# Patient Record
Sex: FEMALE | Race: BLACK OR AFRICAN AMERICAN | NOT HISPANIC OR LATINO | URBAN - METROPOLITAN AREA
[De-identification: names, ages, dates, MRNs, and addresses within clinical notes are randomized per-mention and may not be internally consistent; named-entity substitution may affect disease eponyms.]

---

## 2018-04-24 ENCOUNTER — APPOINTMENT (RX ONLY)
Dept: URBAN - METROPOLITAN AREA CLINIC 12 | Facility: CLINIC | Age: 53
Setting detail: DERMATOLOGY
End: 2018-04-24

## 2018-04-24 DIAGNOSIS — Z41.1 ENCOUNTER FOR COSMETIC SURGERY: ICD-10-CM

## 2018-04-24 PROCEDURE — ? PATIENT SPECIFIC COUNSELING

## 2018-04-24 PROCEDURE — ? CONSULTATION - RHINOPLASTY

## 2018-04-24 PROCEDURE — ? CONSULTATION - BREAST (COSMETIC)

## 2018-04-24 NOTE — PROCEDURE: PATIENT SPECIFIC COUNSELING
Detail Level: Zone
Other (Free Text): Patient had a Rhinoplasty 30 years ago. Pt presents today with concern about the crooked appearance of her nose. She states her nose was crooked before her prior procedure. A cartilage graft was placed in her nose during her prior procedure, however she does not remember where the cartilage was grafted from. Dr. Swan discussed with patient that he can feel that there is a cartilage graft out of place in the bridge of her nose.\\n Dr. Swan informed pt that she is a great candidate for a rhinoplasty. Explained to pt that depending on the graft that was placed there, it may need to be replaced with another graft. Informed pt that the cartilage could be grafted from her rib or an artificial graft could be used from pig cartilage, which would be an easier recovery.
Other (Free Text): Patient had silicone implants put in place 30 years ago. She states she does not know the implant size, but that she would like to increase in size. Patient currently wears a 36 C.\\nBreast Exam: \\nPatient has grade 3 capsules. \\nAreolar diameter: (L) 4 cm (R) 3 cm\\n\\Thien Swan informed pt that she is a great candidate for a breast implant exchange. Dr. Swan explained to pt that in order to get the capsule and scar tissue out, an incision would need to be made in the crease of her breast as opposed to her previous incisions in periarealor. Dr. Swan discussed with pt that it would not be an issue to go larger. \\n\\Thien Swan informed pt that both procedures can be done in the OR together. \\nDana quoted pt for rhinoplasty, with and without cartilage implant, and a breast implant exchange.

## 2018-04-24 NOTE — PROCEDURE: CONSULTATION - BREAST (COSMETIC)
Detail Level: Simple
Consultation Charge $ (Use Numbers Only, No Text Please.): 100
Send Procedure Quote As Charge: No

## 2018-04-24 NOTE — HPI: BREAST (AESTHETIC DEFORMITY, BREAST)
Which Breast(S) Are You Concerned About?: bilateral breasts
Which Side(S)?: both breasts (with identical techniques and implants)
Is This A New Presentation, Or A Follow-Up?: Breast (Aesthetic Breast Deformity)
Referral Source: Dr. Pepe
Additional History: Patient is here for a consultation regarding her breast implants. She has had them in for 30 years.

## 2021-03-05 ENCOUNTER — APPOINTMENT (RX ONLY)
Dept: URBAN - METROPOLITAN AREA OTHER 5 | Facility: OTHER | Age: 56
Setting detail: DERMATOLOGY
End: 2021-03-05

## 2021-03-05 DIAGNOSIS — Z41.1 ENCOUNTER FOR COSMETIC SURGERY: ICD-10-CM

## 2021-03-05 PROCEDURE — ? PATIENT SPECIFIC COUNSELING

## 2021-03-05 PROCEDURE — ? CONSULTATION - LIPOSUCTION

## 2021-03-05 PROCEDURE — ? CONSULTATION - BREAST (COSMETIC)

## 2021-03-05 PROCEDURE — ? OTHER

## 2021-03-05 ASSESSMENT — LOCATION DETAILED DESCRIPTION DERM
LOCATION DETAILED: RIGHT SUPERIOR LATERAL LOWER BACK
LOCATION DETAILED: PERIUMBILICAL SKIN
LOCATION DETAILED: LEFT MEDIAL BREAST 6-7:00 REGION
LOCATION DETAILED: RIGHT MEDIAL BREAST 5-6:00 REGION
LOCATION DETAILED: LEFT LATERAL ABDOMEN
LOCATION DETAILED: LEFT MEDIAL BREAST 7-8:00 REGION
LOCATION DETAILED: LEFT SUPERIOR LATERAL LOWER BACK
LOCATION DETAILED: INFERIOR THORACIC SPINE
LOCATION DETAILED: RIGHT LATERAL ABDOMEN
LOCATION DETAILED: LEFT SUPRAPUBIC SKIN

## 2021-03-05 ASSESSMENT — LOCATION SIMPLE DESCRIPTION DERM
LOCATION SIMPLE: LEFT BREAST
LOCATION SIMPLE: RIGHT LOWER BACK
LOCATION SIMPLE: GROIN
LOCATION SIMPLE: UPPER BACK
LOCATION SIMPLE: RIGHT BREAST
LOCATION SIMPLE: ABDOMEN
LOCATION SIMPLE: LEFT LOWER BACK

## 2021-03-05 ASSESSMENT — LOCATION ZONE DERM: LOCATION ZONE: TRUNK

## 2021-03-05 NOTE — HPI: ABDOMEN (AESTHETIC DEFORMITY, ABDOMEN)
Is This A New Presentation, Or A Follow-Up?: Abdominal Aesthetic Deformity
Additional History: Liposuction abdomen and flanks.

## 2021-03-05 NOTE — PROCEDURE: CONSULTATION - BREAST (COSMETIC)
Consultation Charge $ (Use Numbers Only, No Text Please.): 0.00
Send Procedure Quote As Charge: No
Detail Level: Detailed

## 2021-03-05 NOTE — PROCEDURE: CONSULTATION - LIPOSUCTION
Detail Level: Detailed
Send Procedure Quote As Charge: No
Consultation Charge $ (Use Numbers Only, No Text Please.): 0.00
done

## 2021-03-05 NOTE — PROCEDURE: OTHER
Other (Free Text): Patient has significant scoliosis. Patient is aware.
Detail Level: Zone
Render Risk Assessment In Note?: no

## 2021-07-21 ENCOUNTER — APPOINTMENT (RX ONLY)
Dept: URBAN - METROPOLITAN AREA OTHER 5 | Facility: OTHER | Age: 56
Setting detail: DERMATOLOGY
End: 2021-07-21

## 2021-07-21 DIAGNOSIS — Z41.1 ENCOUNTER FOR COSMETIC SURGERY: ICD-10-CM

## 2021-07-21 DIAGNOSIS — E88.1 LIPODYSTROPHY, NOT ELSEWHERE CLASSIFIED: ICD-10-CM

## 2021-07-21 PROCEDURE — ? PRE-OP WORKLIST

## 2021-07-21 PROCEDURE — ? PATIENT SPECIFIC COUNSELING

## 2021-07-21 PROCEDURE — ? PRESCRIPTION

## 2021-07-21 RX ORDER — GABAPENTIN 300 MG/1
CAPSULE ORAL
Qty: 12 | Refills: 0 | Status: ERX | COMMUNITY
Start: 2021-07-21

## 2021-07-21 RX ORDER — CELECOXIB 200 MG/1
CAPSULE ORAL
Qty: 12 | Refills: 0 | Status: ERX | COMMUNITY
Start: 2021-07-21

## 2021-07-21 RX ORDER — ONDANSETRON 8 MG/1
TABLET, ORALLY DISINTEGRATING ORAL
Qty: 10 | Refills: 0 | Status: ERX | COMMUNITY
Start: 2021-07-21

## 2021-07-21 RX ADMIN — GABAPENTIN: 300 CAPSULE ORAL at 00:00

## 2021-07-21 RX ADMIN — ONDANSETRON: 8 TABLET, ORALLY DISINTEGRATING ORAL at 00:00

## 2021-07-21 RX ADMIN — CELECOXIB: 200 CAPSULE ORAL at 00:00

## 2021-07-21 ASSESSMENT — LOCATION DETAILED DESCRIPTION DERM
LOCATION DETAILED: LEFT INFERIOR CENTRAL MALAR CHEEK
LOCATION DETAILED: LEFT SUPERIOR LATERAL LOWER BACK
LOCATION DETAILED: LEFT MEDIAL BREAST 7-8:00 REGION
LOCATION DETAILED: RIGHT SUPERIOR LATERAL LOWER BACK
LOCATION DETAILED: RIGHT LATERAL BREAST 6-7:00 REGION

## 2021-07-21 ASSESSMENT — LOCATION SIMPLE DESCRIPTION DERM
LOCATION SIMPLE: RIGHT BREAST
LOCATION SIMPLE: LEFT LOWER BACK
LOCATION SIMPLE: RIGHT LOWER BACK
LOCATION SIMPLE: LEFT CHEEK
LOCATION SIMPLE: LEFT BREAST

## 2021-07-21 ASSESSMENT — LOCATION ZONE DERM
LOCATION ZONE: TRUNK
LOCATION ZONE: FACE

## 2021-07-21 NOTE — HPI: PREOPERATIVE APPOINTMENT
Has The Patient Completed Informed Consent?: is scheduled to complete informed consent documentation during this visit
Has The Patient Fulfilled Financial Responsibilities For Surgical Scheduling?: has fulfilled financial responsibilities
Have Arrangements And Instructions Been Made For Patient Transportation?: transportation arrangements will be made
Date Of Procedure?: 08/05/2021
Facility: [facility]
Surgeon: Dr. Margarita Swan
Describe The Procedure In Detail: Capsulectomy with implant exchange bilateral, fat grafting to breast and face, liposuction to flanks
Additional History: Patient presents today for preop\\nWeight: 150\\nHeight: 5’5”

## 2021-07-21 NOTE — PROCEDURE: PATIENT SPECIFIC COUNSELING
-- Message is from the [facility]--    Patient is requesting a medication refill - medication is on active medication list    Patient is currently OUT of the requested medication.    Was Medication Pended?  Yes.    Rx Name and Dose:  Ferrous sulfate 325 mg tab    Duration: 90 days    Pharmacy  [facility] [address] [place] [id number] [facility]    Patient confirmed the above pharmacy as correct?  Yes    Caller Information       Type Contact Phone    08/07/2019 01:42 PM Phone (Incoming) STEVE CLARK (Emergency Contact) [phone] (M)          Alternative phone number: none    Turnaround time given to caller:   \"This message will be sent to [state Provider's name]. The clinical team will fulfill your request as soon as they review your message.\"  
Other (Free Text): Dr. Margarita Swan verbal dictation:\\n\\n  She’s here for preop and we have had a very long and detailed discussion about the various procedures. We have discussed the fact that we will make a new incision in the inframammary fold ,remove the old implants and remove as much of the capsule as is safe and then place a new implant. I explained that we will also do fat grafting of the upper part of her breast. I explained that she may have some ptosis and discussed with her the possibility of a mastopexy at the same time or later and we both decided it would be best to wait and do it later. I have warned her that she’s always at risk of developing capsular contracture .  We will start her on Singulair and show her how to massage the breasts.\\n\\n  We discussed the liposuction and the limitations of Lipo on her abdomen given the umbilical hernia and the lower abdominal overhang. We’ve discussed the risks likely outcome and expected recovery. We discussed placing a little of her fat in the nasal labial folds and Botox for the four head in the future.\\n\\nI have responded to all her questions and we will proceed as scheduled
Detail Level: Zone

## 2021-07-21 NOTE — PROCEDURE: PRE-OP WORKLIST
Date Of Evaluation: 07/21/2021
Photos Taken?: yes
Date Of Surgery: 08/05/2021
Surgeon: Dr Margarita Swan
Detail Level: Simple
Surgery Scheduled: Capsulectomy with implant exchange bilateral, fat grafting to breast and face, liposuction to flanks
Surgery Scheduled: Capsulectomy with implant exchange
Surgery Scheduled: Liposuction of flanks and fat transfer to breast and face

## 2021-08-10 ENCOUNTER — RX ONLY (OUTPATIENT)
Age: 56
Setting detail: RX ONLY
End: 2021-08-10

## 2021-08-10 RX ORDER — ONDANSETRON 8 MG/1
TABLET, ORALLY DISINTEGRATING ORAL
Qty: 10 | Refills: 0 | Status: ERX

## 2021-08-10 RX ORDER — CELECOXIB 200 MG/1
CAPSULE ORAL
Qty: 12 | Refills: 0 | Status: ERX

## 2021-08-10 RX ORDER — GABAPENTIN 300 MG/1
CAPSULE ORAL
Qty: 12 | Refills: 0 | Status: ERX

## 2021-08-11 ENCOUNTER — APPOINTMENT (RX ONLY)
Dept: URBAN - METROPOLITAN AREA OTHER 5 | Facility: OTHER | Age: 56
Setting detail: DERMATOLOGY
End: 2021-08-11

## 2021-08-11 ENCOUNTER — RX ONLY (OUTPATIENT)
Age: 56
Setting detail: RX ONLY
End: 2021-08-11

## 2021-08-11 DIAGNOSIS — Z48.89 ENCOUNTER FOR OTHER SPECIFIED SURGICAL AFTERCARE: ICD-10-CM

## 2021-08-11 PROCEDURE — ? PATIENT SPECIFIC COUNSELING

## 2021-08-11 PROCEDURE — ? COUNSELING - POST-OP CHECK, LIPOSUCTION

## 2021-08-11 PROCEDURE — ? COUNSELING - POST-OP CHECK, BREAST IMPLANT EXCHANGE

## 2021-08-11 PROCEDURE — ? COUNSELING - POST-OP CHECK, FAT TRANSFER

## 2021-08-11 RX ORDER — MONTELUKAST SODIUM 10 MG/1
TABLET, FILM COATED ORAL
Qty: 90 | Refills: 6 | Status: ERX | COMMUNITY
Start: 2021-08-11

## 2021-08-11 ASSESSMENT — LOCATION SIMPLE DESCRIPTION DERM
LOCATION SIMPLE: RIGHT LIP
LOCATION SIMPLE: RIGHT THIGH
LOCATION SIMPLE: LEFT BREAST
LOCATION SIMPLE: RIGHT BREAST
LOCATION SIMPLE: LEFT THIGH
LOCATION SIMPLE: ABDOMEN

## 2021-08-11 ASSESSMENT — LOCATION DETAILED DESCRIPTION DERM
LOCATION DETAILED: RIGHT SUPERIOR VERMILION LIP
LOCATION DETAILED: RIGHT ANTERIOR PROXIMAL THIGH
LOCATION DETAILED: RIGHT MEDIAL BREAST 5-6:00 REGION
LOCATION DETAILED: LEFT ANTERIOR LATERAL PROXIMAL THIGH
LOCATION DETAILED: LEFT MEDIAL BREAST 7-8:00 REGION
LOCATION DETAILED: RIGHT INFERIOR VERMILION LIP
LOCATION DETAILED: RIGHT LATERAL ABDOMEN
LOCATION DETAILED: LEFT LATERAL ABDOMEN

## 2021-08-11 ASSESSMENT — LOCATION ZONE DERM
LOCATION ZONE: LIP
LOCATION ZONE: TRUNK
LOCATION ZONE: LEG

## 2021-08-11 NOTE — HPI: POST-OP CHECK, BREAST
History: Patient presents today 5 days post op breast implant exchange and fat grafting - surgery 08/05/2021.

## 2021-08-11 NOTE — PROCEDURE: PATIENT SPECIFIC COUNSELING
Detail Level: Zone
Other (Free Text): Dr. Margarita Swan verbal dictation:\\n\\n  She’s six days postop and doing well. Over the weekend she called me a couple of times. Her first call had to do with the pain meds ,the second was a concern that her lip is too full. I reassured her that it will settle. Today she says she’s very very happy with the results of the breast that they look natural and feel natural. Still concerned that the lips may be overfilled. She has had minimal drainage in her drain since early this morning and only had 20 on one side and 10 on the other yesterday.\\n\\nOn exam she looks good the lips look good and I have reassured her that they will not remain this size.\\n\\nHer breasts look good. They are soft symmetrical ,no hematoma ,no seroma, no sign of infection. We infiltrated around the drains and remove them without any problems. We removed the tapes. The suction areas have the expected bruising.\\n\\nWe have given her instructions for the further care of her breasts ,lips, and body and I will see her again in one week.

## 2021-08-18 ENCOUNTER — RX ONLY (OUTPATIENT)
Age: 56
Setting detail: RX ONLY
End: 2021-08-18

## 2021-08-18 ENCOUNTER — APPOINTMENT (RX ONLY)
Dept: URBAN - METROPOLITAN AREA OTHER 5 | Facility: OTHER | Age: 56
Setting detail: DERMATOLOGY
End: 2021-08-18

## 2021-08-18 DIAGNOSIS — Z48.89 ENCOUNTER FOR OTHER SPECIFIED SURGICAL AFTERCARE: ICD-10-CM

## 2021-08-18 PROCEDURE — ? COUNSELING - POST-OP CHECK, LIPOSUCTION

## 2021-08-18 PROCEDURE — ? COUNSELING - POST-OP CHECK, FAT TRANSFER

## 2021-08-18 PROCEDURE — ? OTHER

## 2021-08-18 PROCEDURE — ? PATIENT SPECIFIC COUNSELING

## 2021-08-18 PROCEDURE — ? COUNSELING - POST-OP CHECK, BREAST IMPLANT EXCHANGE

## 2021-08-18 RX ORDER — MONTELUKAST SODIUM 10 MG/1
TABLET, FILM COATED ORAL
Qty: 90 | Refills: 6 | Status: ERX

## 2021-08-18 ASSESSMENT — LOCATION ZONE DERM
LOCATION ZONE: FACE
LOCATION ZONE: TRUNK

## 2021-08-18 ASSESSMENT — LOCATION DETAILED DESCRIPTION DERM
LOCATION DETAILED: LEFT MEDIAL BREAST 7-8:00 REGION
LOCATION DETAILED: LEFT SUPERIOR LATERAL LOWER BACK
LOCATION DETAILED: LEFT INFERIOR CENTRAL MALAR CHEEK
LOCATION DETAILED: PERIUMBILICAL SKIN
LOCATION DETAILED: RIGHT MEDIAL BREAST 5-6:00 REGION
LOCATION DETAILED: RIGHT SUPERIOR LATERAL LOWER BACK

## 2021-08-18 ASSESSMENT — LOCATION SIMPLE DESCRIPTION DERM
LOCATION SIMPLE: LEFT CHEEK
LOCATION SIMPLE: LEFT BREAST
LOCATION SIMPLE: LEFT LOWER BACK
LOCATION SIMPLE: RIGHT BREAST
LOCATION SIMPLE: ABDOMEN
LOCATION SIMPLE: RIGHT LOWER BACK

## 2021-08-18 NOTE — PROCEDURE: OTHER
Detail Level: Zone
Render Risk Assessment In Note?: no
Other (Free Text): Suggested for patient to massage lips gently. Patient to start breast massage as tolerated.

## 2021-08-18 NOTE — PROCEDURE: PATIENT SPECIFIC COUNSELING
Other (Free Text): Dr. Margarita Swan verbal dictation:\\n\\n  She will be two weeks postop tomorrow. She’s doing well and says she’s very pleased with the result of all the procedures including the lips.\\n\\nOn exam she looks great ,the upper and lower lips both look good but I warned her that they will continue to go down in size. The breast are soft and symmetrical. We have removed all of the absorbable sutures.\\n\\nI have shown her how to displace the implant and explain to her that I would like to start her on Singulair. I explained that it’s off label but that it will be of some value in preventing recurrent capsular contracture.\\n\\nI’ll see her again in two weeks.
Detail Level: Zone

## 2021-08-27 ENCOUNTER — APPOINTMENT (RX ONLY)
Dept: URBAN - METROPOLITAN AREA OTHER 5 | Facility: OTHER | Age: 56
Setting detail: DERMATOLOGY
End: 2021-08-27

## 2021-08-27 DIAGNOSIS — Z48.89 ENCOUNTER FOR OTHER SPECIFIED SURGICAL AFTERCARE: ICD-10-CM

## 2021-08-27 PROCEDURE — ? OTHER

## 2021-08-27 PROCEDURE — ? COUNSELING - POST-OP CHECK, BREAST IMPLANT EXCHANGE

## 2021-08-27 PROCEDURE — ? PATIENT SPECIFIC COUNSELING

## 2021-08-27 ASSESSMENT — LOCATION DETAILED DESCRIPTION DERM
LOCATION DETAILED: LEFT MEDIAL BREAST 7-8:00 REGION
LOCATION DETAILED: LEFT PERIAREOLAR BREAST 6-7:00 REGION

## 2021-08-27 ASSESSMENT — LOCATION SIMPLE DESCRIPTION DERM: LOCATION SIMPLE: LEFT BREAST

## 2021-08-27 ASSESSMENT — LOCATION ZONE DERM: LOCATION ZONE: TRUNK

## 2021-08-27 NOTE — PROCEDURE: OTHER
Other (Free Text): Recommended to patient to be careful during normal activities. There is a bruise left breast. Continue breast massages. Follow up in 3 weeks.
Render Risk Assessment In Note?: no
Detail Level: Zone

## 2021-08-27 NOTE — PROCEDURE: PATIENT SPECIFIC COUNSELING
Other (Free Text): Dr. Margarita wSan verbal dictation:\\n\\n  She sent us some images of her left breast this morning showing an area of swelling just below the areola at about 5 o’clock. She asked whether this could wait till Monday to see me or whether she should go to an emergency room. We advised her to come in. I was concerned looking at the images that she may have an abscess there.\\n\\nShe denies any history of trauma to the area. She is not aware that it was bumped or manipulated in anyway. She said she’s been doing the displacement massage I had shown her.\\n\\nOn exam the breasts are soft and symmetrical she does have a resolving ecchymosis on the left breast but no sign of any infection or abscess. \\n\\nI have reassured her all is well and I will see her again in three weeks or sooner if needed
Detail Level: Zone

## 2021-10-01 ENCOUNTER — APPOINTMENT (RX ONLY)
Dept: URBAN - METROPOLITAN AREA OTHER 5 | Facility: OTHER | Age: 56
Setting detail: DERMATOLOGY
End: 2021-10-01

## 2021-10-01 ENCOUNTER — RX ONLY (OUTPATIENT)
Age: 56
Setting detail: RX ONLY
End: 2021-10-01

## 2021-10-01 DIAGNOSIS — Z48.89 ENCOUNTER FOR OTHER SPECIFIED SURGICAL AFTERCARE: ICD-10-CM

## 2021-10-01 PROCEDURE — ? COUNSELING - POST-OP CHECK, FAT TRANSFER

## 2021-10-01 PROCEDURE — ? PATIENT SPECIFIC COUNSELING

## 2021-10-01 PROCEDURE — 99024 POSTOP FOLLOW-UP VISIT: CPT

## 2021-10-01 PROCEDURE — ? COUNSELING - POST-OP CHECK, BREAST IMPLANT EXCHANGE

## 2021-10-01 RX ORDER — HYDROQUINONE 4 %
CREAM (GRAM) TOPICAL
Qty: 1 | Refills: 2 | Status: ERX | COMMUNITY
Start: 2021-10-01

## 2021-10-01 ASSESSMENT — LOCATION SIMPLE DESCRIPTION DERM
LOCATION SIMPLE: LEFT BREAST
LOCATION SIMPLE: RIGHT BREAST
LOCATION SIMPLE: LEFT LIP

## 2021-10-01 ASSESSMENT — LOCATION DETAILED DESCRIPTION DERM
LOCATION DETAILED: LEFT MEDIAL BREAST 7-8:00 REGION
LOCATION DETAILED: LEFT SUPERIOR VERMILION LIP
LOCATION DETAILED: RIGHT MEDIAL BREAST 5-6:00 REGION

## 2021-10-01 ASSESSMENT — LOCATION ZONE DERM
LOCATION ZONE: TRUNK
LOCATION ZONE: LIP

## 2021-10-01 NOTE — PROCEDURE: PATIENT SPECIFIC COUNSELING
Other (Free Text): Dr. Margarita Swan verbal dictation:\\n\\n  She’s doing very well but has a couple of questions one involves a small area on the lower lip the other is the fact that the excess incisions have become a little hypertrophic and color. She also wants to know if we can improve further on her abdomen.\\n\\nThe breast are soft and symmetrical and the breast scars look great. The little access area is a slightly hyper pigmented and I believe hydroxyquinone will help for the abdomen . I recommended an Abdominoplasty. She also asked about Botox and we provided her a quote\\n\\nI’ll see her again in one month
Detail Level: Zone

## 2021-10-01 NOTE — PROCEDURE: COUNSELING - POST-OP CHECK, FAT TRANSFER
Detail Level: Simple
Add Postop Global No-Charge Code (84518)?: yes
Count Minor/Major Decisions Toward Mdm (Not Cosmetic)?: No

## 2021-10-01 NOTE — PROCEDURE: COUNSELING - POST-OP CHECK, BREAST IMPLANT EXCHANGE
Add Postop Global No-Charge Code (28994)?: yes
Detail Level: Simple
Count Minor/Major Decisions Toward Mdm (Not Cosmetic)?: No

## 2021-11-03 ENCOUNTER — APPOINTMENT (RX ONLY)
Dept: URBAN - METROPOLITAN AREA OTHER 5 | Facility: OTHER | Age: 56
Setting detail: DERMATOLOGY
End: 2021-11-03

## 2021-11-03 DIAGNOSIS — Z48.89 ENCOUNTER FOR OTHER SPECIFIED SURGICAL AFTERCARE: ICD-10-CM

## 2021-11-03 PROCEDURE — ? PATIENT SPECIFIC COUNSELING

## 2021-11-03 PROCEDURE — ? COUNSELING - POST-OP CHECK, BREAST IMPLANT EXCHANGE

## 2021-11-03 PROCEDURE — 99024 POSTOP FOLLOW-UP VISIT: CPT

## 2021-11-03 PROCEDURE — ? OTHER

## 2021-11-03 ASSESSMENT — LOCATION SIMPLE DESCRIPTION DERM
LOCATION SIMPLE: RIGHT BREAST
LOCATION SIMPLE: LEFT BREAST

## 2021-11-03 ASSESSMENT — LOCATION ZONE DERM: LOCATION ZONE: TRUNK

## 2021-11-03 ASSESSMENT — LOCATION DETAILED DESCRIPTION DERM
LOCATION DETAILED: RIGHT MEDIAL BREAST 4-5:00 REGION
LOCATION DETAILED: LEFT MEDIAL BREAST 7-8:00 REGION

## 2021-11-03 NOTE — HPI: POST-OP CHECK, BREAST
History: Patient is 3 months post op implant exchange and fat transfer bilateral\\nSurgery 08/05/2021

## 2021-11-03 NOTE — PROCEDURE: COUNSELING - POST-OP CHECK, BREAST IMPLANT EXCHANGE
Detail Level: Simple
Count Minor/Major Decisions Toward Mdm (Not Cosmetic)?: No
Add Postop Global No-Charge Code (84162)?: yes

## 2021-11-03 NOTE — PROCEDURE: OTHER
Detail Level: Zone
Other (Free Text): Patient mentioned that left breast is a little more firm than right\\nPatient to continue taking singulair \\nPatient to try more aggressive massages
Render Risk Assessment In Note?: no

## 2021-11-03 NOTE — PROCEDURE: PATIENT SPECIFIC COUNSELING
Other (Free Text): Dr. Margarita Swan verbal dictation:\\n\\n  She is about three months postop. She remains pleased but her concern is that the left breast is not as soft as the right.\\n\\nExamination\\n\\nHer lip looks good she’s happy with it. The Lipo areas look good. The right breast is extremely soft. The left is not as soft as the right but I would not say it even has a class one capsule. I have  asked her to very aggressively massage the left breast and to continue on the Singulair.\\n\\nI will see her again in one month.
Detail Level: Zone

## 2021-12-17 ENCOUNTER — APPOINTMENT (RX ONLY)
Dept: URBAN - METROPOLITAN AREA OTHER 5 | Facility: OTHER | Age: 56
Setting detail: DERMATOLOGY
End: 2021-12-17

## 2021-12-17 DIAGNOSIS — Z41.1 ENCOUNTER FOR COSMETIC SURGERY: ICD-10-CM

## 2021-12-17 DIAGNOSIS — Z48.89 ENCOUNTER FOR OTHER SPECIFIED SURGICAL AFTERCARE: ICD-10-CM

## 2021-12-17 PROCEDURE — ? COUNSELING - POST-OP CHECK, BREAST IMPLANT EXCHANGE

## 2021-12-17 PROCEDURE — 99024 POSTOP FOLLOW-UP VISIT: CPT

## 2021-12-17 PROCEDURE — ? PATIENT SPECIFIC COUNSELING

## 2021-12-17 PROCEDURE — ? BOTOX

## 2021-12-17 ASSESSMENT — LOCATION SIMPLE DESCRIPTION DERM
LOCATION SIMPLE: LEFT BREAST
LOCATION SIMPLE: RIGHT BREAST

## 2021-12-17 ASSESSMENT — LOCATION DETAILED DESCRIPTION DERM
LOCATION DETAILED: LEFT MEDIAL BREAST 7-8:00 REGION
LOCATION DETAILED: RIGHT MEDIAL BREAST 4-5:00 REGION

## 2021-12-17 ASSESSMENT — LOCATION ZONE DERM: LOCATION ZONE: TRUNK

## 2021-12-17 NOTE — PROCEDURE: BOTOX
Show Price In Note?: no
Detail Level: Detailed
Periorbital Skin Units: 0
Bill Summary Price Listed Below, Or Bill Total Of Units X Price Per Unit?: Bill Summary Price Below
[place] Units: 10
Price Per Unit In $ (Use Numbers Only, No Text Please.): 17
Dilution (U/0.1 Cc): 2.2
Consent: Written consent was obtained prior to the procedure. Risks, benefits, expectations and alternatives were discussed including, but not limited to, infection, bleeding, lid/brow ptosis, bruising, swelling, diplopia, temporary effects, incomplete chemical denervation and dissatisfaction with the cosmetic outcome. No guarantee or warranty was given or implied regarding longevity of results.
Expiration Date (Month Year): 02/2024
Glabellar Complex Units: 15
Summary Price $ (Use Numbers Only, No Text Please.): 595
Lot #: [id number]
Map Statment: See attached map for complete details
Postcare Instructions: Patient instructed to not lie down for 4 hours and limit physical activity for 24 hours. Patient instructed not to travel by airplane for 48 hours.

## 2021-12-17 NOTE — PROCEDURE: COUNSELING - POST-OP CHECK, BREAST IMPLANT EXCHANGE
Detail Level: Simple
Count Minor/Major Decisions Toward Mdm (Not Cosmetic)?: No
Add Postop Global No-Charge Code (81022)?: yes

## 2021-12-17 NOTE — PROCEDURE: PATIENT SPECIFIC COUNSELING
Other (Free Text): Dr. Margarita Swan Verbal Dictation:\\n\\n  She continues to do well. The right breast is very soft. The left is also soft but not as soft as the right. I’ve asked her to continue to massage the left side.\\n\\nShe asked about Botox and filler‘s. I didn’t feel she needed fillers but I did inject her with Botox 5 units to each VINCE, 5 units to each   , 2.5 units to the bunny lines ,2.5 units to the procerus ,2.5 units to each frontals. I will check her again in one month.
Detail Level: Zone

## 2022-01-17 ENCOUNTER — APPOINTMENT (RX ONLY)
Dept: URBAN - METROPOLITAN AREA OTHER 5 | Facility: OTHER | Age: 57
Setting detail: DERMATOLOGY
End: 2022-01-17

## 2022-01-17 DIAGNOSIS — Z48.89 ENCOUNTER FOR OTHER SPECIFIED SURGICAL AFTERCARE: ICD-10-CM

## 2022-01-17 PROCEDURE — 99024 POSTOP FOLLOW-UP VISIT: CPT

## 2022-01-17 PROCEDURE — ? OTHER

## 2022-01-17 PROCEDURE — ? COUNSELING - POST-OP CHECK, FAT TRANSFER

## 2022-01-17 PROCEDURE — ? COUNSELING - POST-OP CHECK, LIPOSUCTION

## 2022-01-17 PROCEDURE — ? COUNSELING - POST-OP CHECK, BREAST IMPLANT EXCHANGE

## 2022-01-17 PROCEDURE — ? PATIENT SPECIFIC COUNSELING

## 2022-01-17 ASSESSMENT — LOCATION DETAILED DESCRIPTION DERM
LOCATION DETAILED: PERIUMBILICAL SKIN
LOCATION DETAILED: LEFT PERIAREOLAR BREAST 9-10:00 REGION

## 2022-01-17 ASSESSMENT — LOCATION SIMPLE DESCRIPTION DERM
LOCATION SIMPLE: ABDOMEN
LOCATION SIMPLE: LEFT BREAST

## 2022-01-17 ASSESSMENT — LOCATION ZONE DERM: LOCATION ZONE: TRUNK

## 2022-01-17 NOTE — PROCEDURE: COUNSELING - POST-OP CHECK, FAT TRANSFER
Detail Level: Simple
Add Postop Global No-Charge Code (48452)?: yes
Count Minor/Major Decisions Toward Mdm (Not Cosmetic)?: No

## 2022-01-17 NOTE — PROCEDURE: PATIENT SPECIFIC COUNSELING
Other (Free Text): Dr. Margarita Swan verbal dictation:\\n\\nHer right breast remains extra soft. The left is no further than it was on the last visit. She is to continue with the Singulair. She asked me about the external capsule and I have explain to her that it’s dangerous and runs the risk of rupturing the implant.\\n\\nI’ve explain to her that if we don’t see much progress we may consider surgical intervention.\\n\\nShe asked about face tiite for her neck and I told her that her neck looks good and she’s not a candidate\\n\\nI’ll see her again in one
Detail Level: Zone

## 2022-01-17 NOTE — PROCEDURE: COUNSELING - POST-OP CHECK, LIPOSUCTION
Detail Level: Simple
Count Minor/Major Decisions Toward Mdm (Not Cosmetic)?: No
Add Postop Global No-Charge Code (92962)?: yes

## 2022-01-17 NOTE — HPI: POST-OP CHECK, BREAST
History: Patient presents today 5 months post op implant exchange with fat grafting \\nSurgery 08/05/2021

## 2022-01-17 NOTE — PROCEDURE: COUNSELING - POST-OP CHECK, BREAST IMPLANT EXCHANGE
Add Postop Global No-Charge Code (03450)?: yes
Count Minor/Major Decisions Toward Mdm (Not Cosmetic)?: No
Detail Level: Simple

## 2022-02-25 ENCOUNTER — APPOINTMENT (RX ONLY)
Dept: URBAN - METROPOLITAN AREA OTHER 5 | Facility: OTHER | Age: 57
Setting detail: DERMATOLOGY
End: 2022-02-25

## 2022-02-25 DIAGNOSIS — Z48.89 ENCOUNTER FOR OTHER SPECIFIED SURGICAL AFTERCARE: ICD-10-CM

## 2022-02-25 PROCEDURE — ? OTHER

## 2022-02-25 PROCEDURE — ? PATIENT SPECIFIC COUNSELING

## 2022-02-25 PROCEDURE — ? COUNSELING - POST-OP CHECK, LIPOSUCTION

## 2022-02-25 PROCEDURE — ? COUNSELING - POST-OP CHECK, FAT TRANSFER

## 2022-02-25 PROCEDURE — ? COUNSELING - POST-OP CHECK, BREAST IMPLANT EXCHANGE

## 2022-02-25 PROCEDURE — 99024 POSTOP FOLLOW-UP VISIT: CPT

## 2022-02-25 ASSESSMENT — LOCATION SIMPLE DESCRIPTION DERM
LOCATION SIMPLE: RIGHT BREAST
LOCATION SIMPLE: LEFT BREAST

## 2022-02-25 ASSESSMENT — LOCATION DETAILED DESCRIPTION DERM
LOCATION DETAILED: RIGHT MEDIAL BREAST 5-6:00 REGION
LOCATION DETAILED: LEFT MEDIAL BREAST 7-8:00 REGION

## 2022-02-25 ASSESSMENT — LOCATION ZONE DERM: LOCATION ZONE: TRUNK

## 2022-02-25 NOTE — PROCEDURE: COUNSELING - POST-OP CHECK, FAT TRANSFER
Detail Level: Simple
Count Minor/Major Decisions Toward Mdm (Not Cosmetic)?: No
Add Postop Global No-Charge Code (39963)?: yes

## 2022-02-25 NOTE — PROCEDURE: PATIENT SPECIFIC COUNSELING
Other (Free Text): Dr. Margarita Swan verbal dictation:\\n\\nThe left breast is softer but she still has a grade one capsular contracture. As a result the right breast does not appear as large as the right. She asked if fat grafts would improve the size. I have explained to her that rather than fat grafting the better option would be a capsulotomy. I did explain that a capsulotomy Would entail a general anesthetic and a surgical approach. She’s aware of this. I’ll see her again in one week.
Detail Level: Zone

## 2022-02-25 NOTE — HPI: POST-OP CHECK, BREAST
History: Patient presents today 6 months post op implant exchange, fat transfer, liposuction \\nSurgery 08/05/2021

## 2022-02-25 NOTE — PROCEDURE: COUNSELING - POST-OP CHECK, BREAST IMPLANT EXCHANGE
Detail Level: Simple
Add Postop Global No-Charge Code (88104)?: yes
Count Minor/Major Decisions Toward Mdm (Not Cosmetic)?: No

## 2022-02-25 NOTE — PROCEDURE: COUNSELING - POST-OP CHECK, LIPOSUCTION
Add Postop Global No-Charge Code (76598)?: yes
Detail Level: Simple
Count Minor/Major Decisions Toward Mdm (Not Cosmetic)?: No

## 2022-03-23 ENCOUNTER — APPOINTMENT (RX ONLY)
Dept: URBAN - METROPOLITAN AREA OTHER 5 | Facility: OTHER | Age: 57
Setting detail: DERMATOLOGY
End: 2022-03-23

## 2022-03-23 DIAGNOSIS — Z48.89 ENCOUNTER FOR OTHER SPECIFIED SURGICAL AFTERCARE: ICD-10-CM

## 2022-03-23 PROCEDURE — 99024 POSTOP FOLLOW-UP VISIT: CPT

## 2022-03-23 PROCEDURE — ? OTHER

## 2022-03-23 PROCEDURE — ? PATIENT SPECIFIC COUNSELING

## 2022-03-23 PROCEDURE — ? COUNSELING - POST-OP CHECK, LIPOSUCTION

## 2022-03-23 PROCEDURE — ? COUNSELING - POST-OP CHECK, BREAST IMPLANT EXCHANGE

## 2022-03-23 PROCEDURE — ? COUNSELING - POST-OP CHECK, FAT TRANSFER

## 2022-03-23 ASSESSMENT — LOCATION DETAILED DESCRIPTION DERM
LOCATION DETAILED: LEFT SUPERIOR CENTRAL BUCCAL CHEEK
LOCATION DETAILED: LEFT MEDIAL BREAST 7-8:00 REGION
LOCATION DETAILED: PERIUMBILICAL SKIN
LOCATION DETAILED: SUPERIOR LUMBAR SPINE
LOCATION DETAILED: RIGHT MEDIAL BREAST 4-5:00 REGION

## 2022-03-23 ASSESSMENT — LOCATION ZONE DERM
LOCATION ZONE: TRUNK
LOCATION ZONE: FACE

## 2022-03-23 ASSESSMENT — LOCATION SIMPLE DESCRIPTION DERM
LOCATION SIMPLE: LEFT CHEEK
LOCATION SIMPLE: RIGHT BREAST
LOCATION SIMPLE: ABDOMEN
LOCATION SIMPLE: LEFT BREAST
LOCATION SIMPLE: LOWER BACK

## 2022-03-23 NOTE — PROCEDURE: COUNSELING - POST-OP CHECK, BREAST IMPLANT EXCHANGE
Count Minor/Major Decisions Toward Mdm (Not Cosmetic)?: No
Detail Level: Simple
Add Postop Global No-Charge Code (22977)?: yes

## 2022-03-23 NOTE — PROCEDURE: COUNSELING - POST-OP CHECK, FAT TRANSFER
Add Postop Global No-Charge Code (67402)?: yes
Count Minor/Major Decisions Toward Mdm (Not Cosmetic)?: No
Detail Level: Simple

## 2022-03-23 NOTE — PROCEDURE: COUNSELING - POST-OP CHECK, LIPOSUCTION
Detail Level: Simple
Add Postop Global No-Charge Code (79019)?: yes
Count Minor/Major Decisions Toward Mdm (Not Cosmetic)?: No

## 2022-03-23 NOTE — PROCEDURE: PATIENT SPECIFIC COUNSELING
Detail Level: Zone
Other (Free Text): Dr. Margarita Swan verbal dictation:\\n\\nThe left breast is unchanged. She does want to go ahead with the capsulotomy on the left . She would like to increase the size of her implants. She would like to have fat grafting to the dorsum of the hand ,more fat to the lips and nasal labial folds. We have discussed these procedures and we will schedule her.

## 2022-03-23 NOTE — HPI: POST-OP CHECK, BREAST
History: Patient presents today 7 months post op implant exchange, fat transfer, liposuction \\nSurgery 08/05/2021

## 2022-06-24 ENCOUNTER — APPOINTMENT (RX ONLY)
Dept: URBAN - METROPOLITAN AREA OTHER 5 | Facility: OTHER | Age: 57
Setting detail: DERMATOLOGY
End: 2022-06-24

## 2022-06-24 DIAGNOSIS — Z41.1 ENCOUNTER FOR COSMETIC SURGERY: ICD-10-CM

## 2022-06-24 PROCEDURE — ? PATIENT SPECIFIC COUNSELING

## 2022-06-24 PROCEDURE — ? CONSULTATION - AGING FACE

## 2022-06-24 PROCEDURE — ? BOTOX

## 2022-06-24 PROCEDURE — ? FILLERS

## 2022-06-24 ASSESSMENT — LOCATION SIMPLE DESCRIPTION DERM: LOCATION SIMPLE: LEFT CHEEK

## 2022-06-24 ASSESSMENT — LOCATION DETAILED DESCRIPTION DERM
LOCATION DETAILED: LEFT INFERIOR CENTRAL MALAR CHEEK
LOCATION DETAILED: LEFT SUPERIOR CENTRAL BUCCAL CHEEK

## 2022-06-24 ASSESSMENT — LOCATION ZONE DERM: LOCATION ZONE: FACE

## 2022-06-24 NOTE — PROCEDURE: BOTOX
Additional Area 1 Location: bunny lines
Expiration Date (Month Year): 10/2023
Forehead Units: 10
Perioral Units: 0
Price Per Unit In $ (Use Numbers Only, No Text Please.): 17
Dilution (U/0.1 Cc): 2.2
Consent: Written consent was obtained prior to the procedure. Risks, benefits, expectations and alternatives were discussed including, but not limited to, infection, bleeding, lid/brow ptosis, bruising, swelling, diplopia, temporary effects, incomplete chemical denervation and dissatisfaction with the cosmetic outcome. No guarantee or warranty was given or implied regarding longevity of results.
Map Statment: See attached map for complete details
Postcare Instructions: Patient instructed to not lie down for 4 hours and limit physical activity for 24 hours. Patient instructed not to travel by airplane for 48 hours.
Additional Area 1 Units: 5
Bill Summary Price Listed Below, Or Bill Total Of Units X Price Per Unit?: Bill #Units x Price Per Unit
Show Price In Note?: yes
Lot #: [id number]
Detail Level: Detailed
Reconstitution Date: 06/2022

## 2022-06-24 NOTE — PROCEDURE: FILLERS
Mid Face Filler Volume In Cc: 0
Use Map Statement For Sites (Optional): Yes
Expiration Date (Month Year): 2022-12-05
Map Statment: See attached map for complete details
Price $ (Numeric Only, No Text Please.): 048
Detail Level: Detailed
Filler: Juvederm Ultra XC
Lot #: [id number]
Consent: Written consent obtained. Risks include but not limited to bruising, beading, irregular texture, ulceration, infection, allergic reaction, scar formation, incomplete augmentation, temporary nature, procedural pain.

## 2022-06-24 NOTE — PROCEDURE: PATIENT SPECIFIC COUNSELING
Detail Level: Zone
Other (Free Text): Dr. Margarita Swan verbal dictation:\\n\\nShe would like Botox today and has asked for fillers as well. I have explained to her that  we are planning to operate so it may be best to wait and inject fat rather than filler. She said she’s not sure when she will go ahead with the surgery and would like to proceed with fillers today.\\n\\nWith informed consent I marked her ,we apply topical to the nasolabial folds. We then re-prepped and I injected 5 units to each VINCE with 5.5  units to each  ,2.5 units to each set of bunny lines ,2.5 units to the procerus ,.5 units to each frontalis.\\n\\nI did inject a half a vial of Juvederm ultra XC to the  folds which she tolerated well

## 2022-06-24 NOTE — HPI: FACE (AGING FACE)
Is This A New Presentation, Or A Follow-Up?: Follow Up Aging Face
Additional History: Patient presents today for Botox and filler, consultation for facial surgery

## 2025-02-05 ENCOUNTER — APPOINTMENT (OUTPATIENT)
Dept: URBAN - METROPOLITAN AREA OTHER 5 | Facility: OTHER | Age: 60
Setting detail: DERMATOLOGY
End: 2025-02-05

## 2025-02-05 DIAGNOSIS — N64.4 MASTODYNIA: ICD-10-CM

## 2025-02-05 PROCEDURE — ? OTHER (COSMETIC)

## 2025-02-05 NOTE — PROCEDURE: OTHER (COSMETIC)
Sticky Other (Free Text): No charge visit. \\n\\nPt presents post car accident (2/2/25). Pt states that she is experiencing pain and tenderness in her breast but is unsure of implant rupture,
Detail Level: Zone
Price $ (Use Numbers Only, No Text Please.): 0.00

## 2025-02-10 ENCOUNTER — APPOINTMENT (OUTPATIENT)
Dept: URBAN - METROPOLITAN AREA OTHER 5 | Facility: OTHER | Age: 60
Setting detail: DERMATOLOGY
End: 2025-02-10

## 2025-02-10 DIAGNOSIS — N64.4 MASTODYNIA: ICD-10-CM

## 2025-02-10 PROCEDURE — ? OTHER (COSMETIC)

## 2025-02-10 NOTE — PROCEDURE: OTHER (COSMETIC)
Sticky Other (Free Text): No charge visit. \\n\\nPt presents post car accident (2/2/25). Pt needs MRI record sent to Dr. Sosa biswas.
Detail Level: Zone
Price $ (Use Numbers Only, No Text Please.): 0.00

## 2025-08-11 ENCOUNTER — APPOINTMENT (OUTPATIENT)
Dept: URBAN - METROPOLITAN AREA OTHER 5 | Facility: OTHER | Age: 60
Setting detail: DERMATOLOGY
End: 2025-08-11

## 2025-08-11 DIAGNOSIS — Z41.1 ENCOUNTER FOR COSMETIC SURGERY: ICD-10-CM

## 2025-08-11 PROCEDURE — ? PATIENT SPECIFIC COUNSELING

## 2025-08-11 PROCEDURE — ? BOTOX

## 2025-08-11 PROCEDURE — ? CONSULTATION - FACELIFT

## 2025-08-11 PROCEDURE — ? CONSULTATION - BREAST (COSMETIC)

## 2025-08-11 ASSESSMENT — LOCATION ZONE DERM: LOCATION ZONE: FACE

## 2025-08-11 ASSESSMENT — LOCATION SIMPLE DESCRIPTION DERM: LOCATION SIMPLE: LEFT CHEEK

## 2025-08-11 ASSESSMENT — LOCATION DETAILED DESCRIPTION DERM: LOCATION DETAILED: LEFT SUPERIOR CENTRAL BUCCAL CHEEK

## 2025-08-28 ENCOUNTER — APPOINTMENT (OUTPATIENT)
Dept: URBAN - METROPOLITAN AREA OTHER 5 | Facility: OTHER | Age: 60
Setting detail: DERMATOLOGY
End: 2025-08-28

## 2025-08-28 DIAGNOSIS — Z41.1 ENCOUNTER FOR COSMETIC SURGERY: ICD-10-CM

## 2025-08-28 PROCEDURE — ? CONSULTATION - BREAST (COSMETIC)

## 2025-08-28 PROCEDURE — ? CONSULTATION - LIPOSUCTION

## 2025-08-28 PROCEDURE — ? CONSULTATION - AGING FACE
